# Patient Record
Sex: MALE | Race: WHITE | Employment: OTHER | URBAN - NONMETROPOLITAN AREA
[De-identification: names, ages, dates, MRNs, and addresses within clinical notes are randomized per-mention and may not be internally consistent; named-entity substitution may affect disease eponyms.]

---

## 2024-08-27 RX ORDER — ATORVASTATIN CALCIUM 40 MG/1
40 TABLET, FILM COATED ORAL DAILY
COMMUNITY

## 2024-08-27 RX ORDER — ALENDRONATE SODIUM 35 MG/1
35 TABLET ORAL
COMMUNITY

## 2024-08-27 RX ORDER — LISINOPRIL 10 MG/1
10 TABLET ORAL DAILY
COMMUNITY

## 2024-08-27 RX ORDER — CARVEDILOL 6.25 MG/1
6.25 TABLET ORAL 2 TIMES DAILY WITH MEALS
COMMUNITY

## 2024-08-27 NOTE — PROGRESS NOTES
NPO after midnight  Bring insurance info and drivers license  Wear comfortable clean clothing  Do not bring jewelry  Shower night before and morning of surgery with a liquid antibacterial soap  Bring list of medications with dosage and how often taken  Follow all instructions given by your physician   needed at discharge  You must have a responsible adult with you day of surgery and for 24 hours after surgery  Call [phone] for any questions

## 2024-08-27 NOTE — PROGRESS NOTES
In preparation for their surgical procedure above patient was screened for Obstructive Sleep Apnea ([place]) using the STOP-Bang Questionnaire by the Pre-Admission Testing department.  This is a pre-surgical screening tool for patient safety and serves as a recommendation, this WILL NOT cause cancellation of surgery.    STOP-Bang Questionnaire  * Do you currently see a pulmonologist?  No     If yes STOP, do not complete.  Patient follows with Dr.     1.  Do you snore loudly (able to be heard in the next room)?      No    2.  Do you often feel tired or sleepy during the daytime?          No       3.  Has anyone ever told you that you stop breathing during your sleep?       No    4.  Do you have or are you being treated for high blood pressure?          Yes      5.  BMI more than 35?  BMI (Calculated): 26.6        No    6.  Age over 50 years? 78 y.o.      Yes    7.  Neck Circumference greater than 17 inches for male or 16 inches for female?  Measured           (visits only)            Not Applicable    8.  Gender Male?                 Yes      TOTAL SCORE: 3    [place] - Low Risk : Yes to 0 - 2 questions  [place] - Intermediate Risk : Yes to 3 - 4 questions  [place] - High Risk : Yes to 5 - 8 questions    Adapted from:   STOP Questionnaire: A Tool to Screen Patients for Obstructive Sleep Apnea   [facility] Jf Enriquez M.B.B.S., Lisseth Kelly M.D., Ambar Valencia, Ph.D., DIANA Conley.B.B.S., DIANA Elena.Sc., Corrine Miller M.D., Gavino Wilcox F.R.C.P.C.   Anesthesiology 2008; 108:812-21 Copyright 2008, the American Society of Anesthesiologists, Inc. [employer]   ----------------------------------------------------------------------------------------------------------------

## 2024-08-30 ENCOUNTER — ANESTHESIA EVENT (OUTPATIENT)
Dept: OPERATING ROOM | Age: 79
End: 2024-08-30
Payer: MEDICARE

## 2024-08-30 ENCOUNTER — ANESTHESIA (OUTPATIENT)
Dept: OPERATING ROOM | Age: 79
End: 2024-08-30
Payer: MEDICARE

## 2024-08-30 ENCOUNTER — HOSPITAL ENCOUNTER (OUTPATIENT)
Age: 79
Setting detail: OUTPATIENT SURGERY
Discharge: HOME | End: 2024-08-30
Attending: SPECIALIST | Admitting: SPECIALIST
Payer: MEDICARE

## 2024-08-30 VITALS
WEIGHT: 183 LBS | HEART RATE: 61 BPM | HEIGHT: 70 IN | DIASTOLIC BLOOD PRESSURE: 83 MMHG | TEMPERATURE: 97.4 F | RESPIRATION RATE: 16 BRPM | SYSTOLIC BLOOD PRESSURE: 176 MMHG | BODY MASS INDEX: 26.2 KG/M2 | OXYGEN SATURATION: 98 %

## 2024-08-30 DIAGNOSIS — C44.321 SQUAMOUS CELL CARCINOMA OF SKIN OF NOSE: Primary | ICD-10-CM

## 2024-08-30 PROCEDURE — 3700000000 HC ANESTHESIA ATTENDED CARE: Performed by: SPECIALIST

## 2024-08-30 PROCEDURE — 7100000011 HC PHASE II RECOVERY - ADDTL 15 MIN: Performed by: SPECIALIST

## 2024-08-30 PROCEDURE — 2580000003 HC RX 258: Performed by: SPECIALIST

## 2024-08-30 PROCEDURE — 3600000002 HC SURGERY LEVEL 2 BASE: Performed by: SPECIALIST

## 2024-08-30 PROCEDURE — 6360000002 HC RX W HCPCS: Performed by: SPECIALIST

## 2024-08-30 PROCEDURE — 6360000002 HC RX W HCPCS: Performed by: NURSE ANESTHETIST, CERTIFIED REGISTERED

## 2024-08-30 PROCEDURE — 2500000003 HC RX 250 WO HCPCS: Performed by: NURSE ANESTHETIST, CERTIFIED REGISTERED

## 2024-08-30 PROCEDURE — 3700000001 HC ADD 15 MINUTES (ANESTHESIA): Performed by: SPECIALIST

## 2024-08-30 PROCEDURE — 2709999900 HC NON-CHARGEABLE SUPPLY: Performed by: SPECIALIST

## 2024-08-30 PROCEDURE — 6370000000 HC RX 637 (ALT 250 FOR IP): Performed by: SPECIALIST

## 2024-08-30 PROCEDURE — 2580000003 HC RX 258: Performed by: NURSE ANESTHETIST, CERTIFIED REGISTERED

## 2024-08-30 PROCEDURE — 7100000010 HC PHASE II RECOVERY - FIRST 15 MIN: Performed by: SPECIALIST

## 2024-08-30 PROCEDURE — 3600000012 HC SURGERY LEVEL 2 ADDTL 15MIN: Performed by: SPECIALIST

## 2024-08-30 PROCEDURE — 2500000003 HC RX 250 WO HCPCS: Performed by: SPECIALIST

## 2024-08-30 RX ORDER — TRAMADOL HYDROCHLORIDE 50 MG/1
50 TABLET ORAL EVERY 6 HOURS PRN
Qty: 12 TABLET | Refills: 0 | Status: SHIPPED | OUTPATIENT
Start: 2024-08-30 | End: 2024-09-02

## 2024-08-30 RX ORDER — FENTANYL CITRATE 50 UG/ML
INJECTION, SOLUTION INTRAMUSCULAR; INTRAVENOUS PRN
Status: DISCONTINUED | OUTPATIENT
Start: 2024-08-30 | End: 2024-08-30 | Stop reason: SDUPTHER

## 2024-08-30 RX ORDER — MUPIROCIN 20 MG/G
OINTMENT TOPICAL PRN
Status: DISCONTINUED | OUTPATIENT
Start: 2024-08-30 | End: 2024-08-30 | Stop reason: ALTCHOICE

## 2024-08-30 RX ORDER — LIDOCAINE HYDROCHLORIDE 20 MG/ML
INJECTION, SOLUTION EPIDURAL; INFILTRATION; INTRACAUDAL; PERINEURAL PRN
Status: DISCONTINUED | OUTPATIENT
Start: 2024-08-30 | End: 2024-08-30 | Stop reason: SDUPTHER

## 2024-08-30 RX ORDER — LIDOCAINE HYDROCHLORIDE AND EPINEPHRINE 10; 10 MG/ML; UG/ML
INJECTION, SOLUTION INFILTRATION; PERINEURAL PRN
Status: DISCONTINUED | OUTPATIENT
Start: 2024-08-30 | End: 2024-08-30 | Stop reason: ALTCHOICE

## 2024-08-30 RX ORDER — PROPOFOL 10 MG/ML
INJECTION, EMULSION INTRAVENOUS PRN
Status: DISCONTINUED | OUTPATIENT
Start: 2024-08-30 | End: 2024-08-30 | Stop reason: SDUPTHER

## 2024-08-30 RX ORDER — SODIUM CHLORIDE 9 MG/ML
INJECTION, SOLUTION INTRAVENOUS CONTINUOUS PRN
Status: DISCONTINUED | OUTPATIENT
Start: 2024-08-30 | End: 2024-08-30 | Stop reason: SDUPTHER

## 2024-08-30 RX ADMIN — SODIUM CHLORIDE: 9 INJECTION, SOLUTION INTRAVENOUS at 11:30

## 2024-08-30 RX ADMIN — LIDOCAINE HYDROCHLORIDE 80 MG: 20 INJECTION, SOLUTION EPIDURAL; INFILTRATION; INTRACAUDAL; PERINEURAL at 11:33

## 2024-08-30 RX ADMIN — FENTANYL CITRATE 50 MCG: 50 INJECTION, SOLUTION INTRAMUSCULAR; INTRAVENOUS at 11:32

## 2024-08-30 RX ADMIN — PROPOFOL 40 MG: 10 INJECTION, EMULSION INTRAVENOUS at 11:34

## 2024-08-30 RX ADMIN — PROPOFOL 20 MG: 10 INJECTION, EMULSION INTRAVENOUS at 11:50

## 2024-08-30 RX ADMIN — FENTANYL CITRATE 25 MCG: 50 INJECTION, SOLUTION INTRAMUSCULAR; INTRAVENOUS at 11:50

## 2024-08-30 RX ADMIN — LIDOCAINE HYDROCHLORIDE 20 MG: 20 INJECTION, SOLUTION EPIDURAL; INFILTRATION; INTRACAUDAL; PERINEURAL at 11:50

## 2024-08-30 RX ADMIN — WATER 2000 MG: 1 INJECTION INTRAMUSCULAR; INTRAVENOUS; SUBCUTANEOUS at 11:35

## 2024-08-30 RX ADMIN — FENTANYL CITRATE 25 MCG: 50 INJECTION, SOLUTION INTRAMUSCULAR; INTRAVENOUS at 11:56

## 2024-08-30 ASSESSMENT — PAIN - FUNCTIONAL ASSESSMENT
PAIN_FUNCTIONAL_ASSESSMENT: NONE - DENIES PAIN
PAIN_FUNCTIONAL_ASSESSMENT: 0-10

## 2024-08-30 NOTE — DISCHARGE INSTRUCTIONS
POST OPERATIVE INSTRUCTION SHEET  SKIN TUMOR/LESION REMOVAL          Activity:    No strenuous activity for 48 hours  No activity that stresses the suture closure/incision  Regular diet  ABSOLUTELY NO NICOTINE OF ANY TYPE    Wound Care:  Leave yellow dressing on nose in place. Do not remove yellow dressing and do not get it wet.  Dermabond was used in front of your ear, do not scrub, rub or pick at adhesive glue  Recommend sleeping in a recliner or with head elevated for next 2-3 nights.    Limitations:  No swimming, hot tub, sauna or soaking in a bathtub    Prescriptions:  Take exactly as prescribed    Follow-Up:  Follow up in the office September 5 at 2:15 pm      Notify our office if you experience any of the following:   Develop a fever (temperature is greater than 100.5F)   Develop redness greater than 1 cm around incision or red streaks up extremity   Have any excess bleeding/ increased drainage or swelling at the incision site    *A prescription for Tramadol has been sent to your pharmacy        How Do you Prevent Surgical Site Infections?       *HAND WASHING     *STOP SHAVING   Wash your hands multiple times daily  Do not shave incisional area 48 hours before   with soap for 20 seconds.    or until 2 weeks after surgery.  This can   Before eating and wound care.   cause tiny cuts in the skin which can lead to infection.   After using the bathroom or touching pets.                                    *BALANCE      Times of activity and rest.      Stay away from people who may be sick.             *QUIT SMOKING      Cigarette smoking leads to slow wound      healing.                   *WASH YOUR BODY      Follow your doctor's instructions on washing the night before and the day of your surgery.       You may use products like:  Dial antibacterial soap, Hibiclens, Raina-hex.         Do not share personal items such as towels, wash cloths, or toothbrush.       *BLOOD SUGAR CONTROL                  Lower blood  sugars help to prevent                          infections, scarring, and slow wound                          healing.  Blood sugar goal less than 200.                   *EAT HEALTHY       Eat plenty of protein,        vegetables, and fruits.                                       Limit sugars and processed foods.                     *BED LINENS      *DRESSING CHANGE        Make sure your bed linens are freshly   Follow your discharge instructions for wound        washed.  NO pets in the bed.  Your animals care and bathing:       carry bacteria in their fur, skin or feathers  ~Keep your dressing clean and dry       which can enter site, causing infection.  ~Clean and washed clothes are important                                                            ~Call the doctor if you develop a fever,                                                       your incision has redness, an odor or                                                             yellowish/greenish drainage.

## 2024-08-30 NOTE — PROGRESS NOTES
1213 Patient arrived to phase II via chair.  Spontaneous respiraitons even and unlabored.  Placed on monitor--VSS.  Report received from OR RN    1214 Assessment completed.  Patient is alert and oriented x4.  IV capped off-- no complications.  Patient denies pain--will monitor.  Surgical sites clean and dry.      1216 Snack and drink provided. Pt. Denies all other needs at this time. Side rails up X2. Call light handed to pt. Bed locked and in low position. Family in room.    1233 RN at bedside. Pt states readiness for discharge.    1235 Discharge instructions reviewed with pt. And family. All questions addressed. AVS handed to family.    1240 Pt. Standing at bedside. With stand by assist of RN. Pt. Denies weakness or dizziness.    1241 INT removed no Complications noted.    1243 Pt. Getting self dressed. Family remains at bedside.    1248 Family left to get private vehicle.    1251 Pt. Ambulated to private vehicle in stable condition with stand by assist of RN.

## 2024-08-30 NOTE — ANESTHESIA PRE PROCEDURE
Types: Cigarettes     Quit date: 1986     Years since quittin.6   • Smokeless tobacco: Not on file   Substance Use Topics   • Alcohol use: No                                Counseling given: Not Answered      Vital Signs (Current):   Vitals:    24 1221   Weight: 83.9 kg (185 lb)   Height: 1.778 m (5' 10\")                                              BP Readings from Last 3 Encounters:   16 160/90       NPO Status:                                                                                 BMI:   Wt Readings from Last 3 Encounters:   24 83.9 kg (185 lb)   16 88 kg (194 lb)     Body mass index is 26.54 kg/m².    CBC:   Lab Results   Component Value Date/Time    WBC 11.2 2024 01:02 PM    RBC 4.61 2024 01:02 PM    HGB 15.5 2024 01:02 PM    HCT 45.3 2024 01:02 PM    MCV 98.5 2024 01:02 PM    RDW 13.8 2024 01:02 PM     2024 01:02 PM       CMP:   Lab Results   Component Value Date/Time     2024 01:02 PM    K 3.9 2024 01:02 PM     2024 01:02 PM    CO2 31 2024 01:02 PM    BUN 11 2024 01:02 PM    CREATININE 0.78 2024 01:02 PM    GLUCOSE 69 2024 01:02 PM    CALCIUM 9.50 2024 01:02 PM       POC Tests: No results for input(s): \"POCGLU\", \"POCNA\", \"POCK\", \"POCCL\", \"POCBUN\", \"POCHEMO\", \"POCHCT\" in the last 72 hours.    Coags: No results found for: \"PROTIME\", \"INR\", \"APTT\"    HCG (If Applicable): No results found for: \"PREGTESTUR\", \"PREGSERUM\", \"HCG\", \"HCGQUANT\"     ABGs: No results found for: \"PHART\", \"PO2ART\", \"YPA3LOZ\", \"JMJ5ZRV\", \"BEART\", \"I6GZTNUO\"     Type & Screen (If Applicable):  No results found for: \"LABABO\"    Drug/Infectious Status (If Applicable):  No results found for: \"HIV\", \"HEPCAB\"    COVID-19 Screening (If Applicable): No results found for: \"COVID19\"        Anesthesia Evaluation  Patient summary reviewed   no history of anesthetic complications:   Airway: Mallampati:  II          Dental:          Pulmonary:normal exam        (-) COPD and asthma                           Cardiovascular:    (+) hypertension:, hyperlipidemia    (-) past MI and CAD                Neuro/Psych:      (-) seizures and CVA           GI/Hepatic/Renal:        (-) GERD, liver disease and no renal disease       Endo/Other:    (+) malignancy/cancer (skin, testicle).    (-) diabetes mellitus, hypothyroidism, hyperthyroidism               Abdominal:             Vascular:     - DVT.      Other Findings:         Anesthesia Plan      MAC     ASA 2       Induction: intravenous.      Anesthetic plan and risks discussed with patient.      Plan discussed with CRNA.                Berhane Holguin, DO   8/30/2024

## 2024-08-30 NOTE — OP NOTE
Operative Note    Patient name: Mark Mayorga             Medical Record Number: [id number]    Primary Care Physician: Rosalie Prasad PA-C     1945    Date of Procedure: 2024    Pre-operative Diagnosis: 1cm2 defect of right ala s/p MOHS for basal cell carcinoma    Post-operative Diagnosis: Same    Procedure Performed: Full thickness skin graft (1 cm2) repair of right alar defect    Surgeons/Assistants: Dr. Berhane Otero MD /Nara Shankar PA-C    Estimated Blood Loss: 5ml     Complications: none immediately appreciated    Procedure:    With the patient lying in the supine position and under adequate anesthesia per the anesthesia team.   Local anesthesia consisting of 11 ml of 1% Lidocaine 1:100,000 with epinephrine solution of the donor site of the right preauricular sulcus as well as the right alar defect.  The area was prepped and draped in the standard surgical fashion.  The patient has very thin skin and a 1cm2 defect which could not be closed primarily, therefore a full thickness skin graft was taken.  It was defatted and secured in position with a 3-0 silk suture tie and then a 5-0 fast absorbable suture was placed in a simple running fashion.  A Bacitracin Xeroform and moist cotton ball tie over bolster was secured using the tails of the silk sutures.  The donor site was closed with a 4-0 Monocryl suture placed in interrupted buried fashion and then Histoacryl respectively.  The patient tolerated the procedure well and remained hemodynamically stable throughout the procedure and was quite comfortable throughout the operative course.    Clinical staging for cancer cases:  Ct  Cn  Cm    Berhane Otero MD  Electronically signed by me on 2024 at 12:01 PMOperative Note      Patient: Mark Mayorga  YOB: 1945  MRN: [id number]    Date of Procedure: 2024    Pre-Op Diagnosis Codes:      * Squamous cell carcinoma of skin of other parts of face [C44.329]    Post-Op Diagnosis:

## 2024-08-30 NOTE — H&P
[facility]  History and Physical Update    Pt Name: Mark Mayorga  MRN: [id number]  YOB: 1945  Date of evaluation: 8/30/2024    I have examined the patient and reviewed the H&P/Consult and there are no changes to the patient or plans.      Berhane Otero MD  Electronically signed 8/30/2024 at 9:29 AM

## 2024-08-30 NOTE — ANESTHESIA POSTPROCEDURE EVALUATION
Department of Anesthesiology  Postprocedure Note    Patient: Mark Mayorga  MRN: [id number]  YOB: 1945  Date of evaluation: 8/30/2024    Procedure Summary       Date: 08/30/24 Room / Location: [address]    Anesthesia Start: 1128 Anesthesia Stop: 1211    Procedure: MOHS DEFECT REPAIR SCC WITH FULL THICKNESS SKIN GRAFT RIGHT NASAL ALA (Right: Face) Diagnosis:       Squamous cell carcinoma of skin of other parts of face      (Squamous cell carcinoma of skin of other parts of face [C44.329])    Surgeons: Berhane Otero MD Responsible Provider: Berhane Holguin DO    Anesthesia Type: MAC ASA Status: 2            Anesthesia Type: No value filed.    Cyn Phase I:      Cyn Phase II: Cyn Score: 10    Anesthesia Post Evaluation    Patient location during evaluation: PACU  Patient participation: complete - patient participated  Level of consciousness: awake and alert  Airway patency: patent  Nausea & Vomiting: no vomiting and no nausea  Cardiovascular status: hemodynamically stable  Respiratory status: acceptable  Hydration status: stable  Pain management: adequate    No notable events documented.

## (undated) DEVICE — BANDAGE,GAUZE,4.5"X4.1YD,STERILE,LF: Brand: MEDLINE

## (undated) DEVICE — GLOVE ORANGE PI 7   MSG9070

## (undated) DEVICE — PACK PROCEDURE SURG PLAS SC MIN SRHP LF

## (undated) DEVICE — COTTON BALL ST

## (undated) DEVICE — SUTURE MONOCRYL SZ 4-0 L18IN ABSRB UD P-3 L13MM 3/8 CIR PRIM Y494G

## (undated) DEVICE — GLOVE SURG SZ 8 L11.77IN FNGR THK9.8MIL STRW LTX POLYMER